# Patient Record
Sex: MALE | Race: WHITE | NOT HISPANIC OR LATINO
[De-identification: names, ages, dates, MRNs, and addresses within clinical notes are randomized per-mention and may not be internally consistent; named-entity substitution may affect disease eponyms.]

---

## 2022-04-21 PROBLEM — Z00.00 ENCOUNTER FOR PREVENTIVE HEALTH EXAMINATION: Status: ACTIVE | Noted: 2022-04-21

## 2022-04-28 PROBLEM — Z82.0 FAMILY HISTORY OF MIGRAINE HEADACHES: Status: ACTIVE | Noted: 2022-04-28

## 2022-04-28 PROBLEM — Z87.891 FORMER LIGHT TOBACCO SMOKER: Status: ACTIVE | Noted: 2022-04-28

## 2022-04-28 PROBLEM — M43.02 CERVICAL SPONDYLOLYSIS: Status: ACTIVE | Noted: 2022-04-28

## 2022-04-28 PROBLEM — G43.909 MIGRAINE: Status: ACTIVE | Noted: 2022-04-28

## 2022-04-28 PROBLEM — Z72.89 ALCOHOL USE: Status: ACTIVE | Noted: 2022-04-28

## 2022-04-28 RX ORDER — ONABOTULINUMTOXINA 200 [USP'U]/1
200 INJECTION, POWDER, LYOPHILIZED, FOR SOLUTION INTRADERMAL; INTRAMUSCULAR
Qty: 1 | Refills: 0 | Status: ACTIVE | COMMUNITY

## 2022-05-02 ENCOUNTER — APPOINTMENT (OUTPATIENT)
Dept: HEART AND VASCULAR | Facility: CLINIC | Age: 52
End: 2022-05-02
Payer: COMMERCIAL

## 2022-05-02 ENCOUNTER — NON-APPOINTMENT (OUTPATIENT)
Age: 52
End: 2022-05-02

## 2022-05-02 VITALS
TEMPERATURE: 97.3 F | WEIGHT: 207 LBS | BODY MASS INDEX: 24.44 KG/M2 | HEART RATE: 85 BPM | OXYGEN SATURATION: 97 % | DIASTOLIC BLOOD PRESSURE: 75 MMHG | HEIGHT: 77 IN | SYSTOLIC BLOOD PRESSURE: 120 MMHG

## 2022-05-02 DIAGNOSIS — Z82.0 FAMILY HISTORY OF EPILEPSY AND OTHER DISEASES OF THE NERVOUS SYSTEM: ICD-10-CM

## 2022-05-02 DIAGNOSIS — Z82.49 FAMILY HISTORY OF ISCHEMIC HEART DISEASE AND OTHER DISEASES OF THE CIRCULATORY SYSTEM: ICD-10-CM

## 2022-05-02 DIAGNOSIS — M43.02 SPONDYLOLYSIS, CERVICAL REGION: ICD-10-CM

## 2022-05-02 DIAGNOSIS — Z72.89 OTHER PROBLEMS RELATED TO LIFESTYLE: ICD-10-CM

## 2022-05-02 DIAGNOSIS — Z84.1 FAMILY HISTORY OF DISORDERS OF KIDNEY AND URETER: ICD-10-CM

## 2022-05-02 DIAGNOSIS — G43.909 MIGRAINE, UNSPECIFIED, NOT INTRACTABLE, W/OUT STATUS MIGRAINOSUS: ICD-10-CM

## 2022-05-02 DIAGNOSIS — Z87.891 PERSONAL HISTORY OF NICOTINE DEPENDENCE: ICD-10-CM

## 2022-05-02 PROCEDURE — 93000 ELECTROCARDIOGRAM COMPLETE: CPT

## 2022-05-02 PROCEDURE — 99204 OFFICE O/P NEW MOD 45 MIN: CPT | Mod: 25

## 2022-05-02 RX ORDER — NAPROXEN 500 MG/1
500 TABLET ORAL
Qty: 30 | Refills: 0 | Status: ACTIVE | COMMUNITY
Start: 2022-04-07

## 2022-05-02 NOTE — REVIEW OF SYSTEMS
[Negative] : Heme/Lymph [Dyspnea on exertion] : dyspnea during exertion [Chest Discomfort] : chest discomfort [de-identified] : + headache

## 2022-05-02 NOTE — HISTORY OF PRESENT ILLNESS
[FreeTextEntry1] : Mr. Hsu presents for initial evaluation and management of HTN, cervical spondylosis, migraine headache, dyslipidemia, and nephrolithiasis. On 04/13/22, he went for a run and had complaints of light headedness, SALMON, and chest pain.  The symptoms lasted for 1-2 hours.  He describes the chest pain as tightness, nonradiating, exertional, 6/10 in intensity, and resolving spontaneously.  The following day, 04/14/22, he saw his PMD, Phoebe Velasquez MD, who sent him to the [facility] ED where he ruled out for an ACS and was sent home.  Since that time he has ongoing SALMON and light headedness.  Of note, his symptoms correlate with the initiation of chlorthalidone and he has been off of it since.

## 2022-05-02 NOTE — ASSESSMENT
[FreeTextEntry1] : 1. HTN: BP at ACC/AHA 2017 guideline target \par      - continue candesartan 32mg po qd\par      - continue off chlorthalidone 25mg po qd secondary to orthostasis\par \par 2. Dyslipidemia:  (04/07/22) \par      - continue atorvastatin 20mg po qd \par      - discussed therapeutic lifestyle changes to promote improved lipid metabolism \par \par 3. Migraine headache, chronic severe: \par      - follow up with neurologist at [facility], Wendy Jenkins MD  \par      - continue candesartan 8mg 2 tabs po qd\par      - continue eletriptan 40mg po prn\par      - continue onabotulinumtoxin A (botox) 200 Units into the muscle area as directed every 3 months\par \par 4. Chest pain and SALMON:\par      - will send for an exercise stress echocardiogram to rule out inducible ischemia \par    \par \par \par An ECG was obtained to evaluate heart rhythm and screen for any underlying cardiac abnormalities. \par \par \par

## 2022-05-02 NOTE — REASON FOR VISIT
[Other: ____] : [unfilled] [FreeTextEntry1] : Diagnostic Tests:\par ------------------------------------------\par ECG:\par 05/02/22: sinus rhythm, normal ECG.

## 2022-05-03 ENCOUNTER — APPOINTMENT (OUTPATIENT)
Dept: HEART AND VASCULAR | Facility: CLINIC | Age: 52
End: 2022-05-03

## 2022-05-17 ENCOUNTER — APPOINTMENT (OUTPATIENT)
Dept: HEART AND VASCULAR | Facility: CLINIC | Age: 52
End: 2022-05-17

## 2022-05-19 ENCOUNTER — NON-APPOINTMENT (OUTPATIENT)
Age: 52
End: 2022-05-19

## 2022-05-19 ENCOUNTER — APPOINTMENT (OUTPATIENT)
Dept: HEART AND VASCULAR | Facility: CLINIC | Age: 52
End: 2022-05-19
Payer: COMMERCIAL

## 2022-05-19 PROCEDURE — 93351 STRESS TTE COMPLETE: CPT

## 2022-09-12 ENCOUNTER — APPOINTMENT (OUTPATIENT)
Dept: HEART AND VASCULAR | Facility: CLINIC | Age: 52
End: 2022-09-12

## 2022-09-12 VITALS
OXYGEN SATURATION: 98 % | SYSTOLIC BLOOD PRESSURE: 131 MMHG | HEIGHT: 77 IN | WEIGHT: 208 LBS | BODY MASS INDEX: 24.56 KG/M2 | DIASTOLIC BLOOD PRESSURE: 84 MMHG | HEART RATE: 77 BPM | TEMPERATURE: 97.1 F

## 2022-09-12 VITALS — SYSTOLIC BLOOD PRESSURE: 147 MMHG | DIASTOLIC BLOOD PRESSURE: 105 MMHG | HEART RATE: 77 BPM

## 2022-09-12 PROCEDURE — 99214 OFFICE O/P EST MOD 30 MIN: CPT

## 2022-09-12 RX ORDER — CHLORTHALIDONE 25 MG/1
25 TABLET ORAL
Qty: 30 | Refills: 0 | Status: DISCONTINUED | COMMUNITY
Start: 2022-04-07 | End: 2022-09-12

## 2022-09-12 RX ORDER — GABAPENTIN 100 MG
100 TABLET ORAL 3 TIMES DAILY
Refills: 0 | Status: DISCONTINUED | COMMUNITY
End: 2022-09-12

## 2022-09-12 RX ORDER — ELETRIPTAN HYDROBROMIDE 40 MG/1
40 TABLET, FILM COATED ORAL
Qty: 9 | Refills: 0 | Status: DISCONTINUED | COMMUNITY
Start: 2022-04-07 | End: 2022-09-12

## 2022-09-12 RX ORDER — RIMEGEPANT SULFATE 75 MG/75MG
75 TABLET, ORALLY DISINTEGRATING ORAL
Qty: 8 | Refills: 0 | Status: ACTIVE | COMMUNITY
Start: 2022-06-30

## 2022-09-12 NOTE — ASSESSMENT
[FreeTextEntry1] : 1. HTN: BP near ACC/AHA 2017 guideline target \par      - continue candesartan 32mg po qd\par      - continue off chlorthalidone 25mg po qd secondary to orthostasis\par      - if BP remains above goal next visit will encourage decreased use of NSAIDS\par \par 2. Dyslipidemia:  (04/07/22) \par      - continue atorvastatin 20mg po qd \par      - discussed therapeutic lifestyle changes to promote improved lipid metabolism \par      - patient will provide copy of recent lab work from PMD's office for my review \par \par 3. Migraine headache, chronic severe: \par      - follow up with neurologist at [facility], Wendy Jenkins MD  \par      - continue Nurtec\par      - continue onabotulinumtoxin A (botox) 200 Units into the muscle area as directed every 3 months\par \par    \par The patient was seen and examined with a cardiology fellow as part of their longitudinal ambulatory cardiology training experience.  Permission was obtained at the time of the visit from the patient for the fellow's participation. \par \par \par

## 2022-09-12 NOTE — REASON FOR VISIT
[FreeTextEntry1] : Diagnostic Tests:\par ------------------------------------------\par ECG:\par 05/02/22: sinus rhythm, normal ECG. \par ------------------------------------------\par Stress tests:\par 05/19/22: exercise stress echo: EF 65%, borderline dilated aortic root (4.2 cm, 1.9 cm/m2), normal wall motion, PA pressures, and ECG post 14.8 METs of exercise.

## 2022-09-12 NOTE — HISTORY OF PRESENT ILLNESS
[FreeTextEntry1] : Mr. Hsu presents for follow up and management of HTN, cervical spondylosis, migraine headache, dyslipidemia, and nephrolithiasis. On 04/13/22, he went for a run and had complaints of light headedness, SALMON, and chest pain.  The symptoms lasted for 1-2 hours.  He describes the chest pain as tightness, nonradiating, exertional, 6/10 in intensity, and resolving spontaneously.  The following day, 04/14/22, he saw his PMD, Phoebe Velasquez MD, who sent him to the [facility] ED where he ruled out for an ACS and was sent home.  Since that time he has ongoing SALMON and light headedness.  Of note, his symptoms correlate with the initiation of chlorthalidone and he has been off of it since. On 05/19/22 he had an exercise stress echocardiogram which revealed an EF of 65%, borderline dilated aortic root (4.2 cm, 1.9 cm/m2), and normal wall motion, PA pressures, and ECG post 14.8 METs of exercise.   At present, he feels well.  His ambulatory BP log reveals an average SBP in the 130s to low 140s.

## 2023-01-12 ENCOUNTER — APPOINTMENT (OUTPATIENT)
Dept: HEART AND VASCULAR | Facility: CLINIC | Age: 53
End: 2023-01-12
Payer: COMMERCIAL

## 2023-01-12 ENCOUNTER — NON-APPOINTMENT (OUTPATIENT)
Age: 53
End: 2023-01-12

## 2023-01-12 VITALS
WEIGHT: 211 LBS | DIASTOLIC BLOOD PRESSURE: 80 MMHG | OXYGEN SATURATION: 98 % | BODY MASS INDEX: 24.91 KG/M2 | HEIGHT: 77 IN | SYSTOLIC BLOOD PRESSURE: 145 MMHG | HEART RATE: 78 BPM

## 2023-01-12 VITALS — DIASTOLIC BLOOD PRESSURE: 73 MMHG | SYSTOLIC BLOOD PRESSURE: 133 MMHG

## 2023-01-12 PROCEDURE — 99214 OFFICE O/P EST MOD 30 MIN: CPT | Mod: 25

## 2023-01-12 PROCEDURE — 93000 ELECTROCARDIOGRAM COMPLETE: CPT

## 2023-01-12 NOTE — HISTORY OF PRESENT ILLNESS
[FreeTextEntry1] : Mr. Hsu presents for follow up and management of HTN, cervical spondylosis, migraine headache, dyslipidemia, and nephrolithiasis. On 04/13/22, he went for a run and had complaints of light headedness, SALMON, and chest pain.  The symptoms lasted for 1-2 hours.  He describes the chest pain as tightness, nonradiating, exertional, 6/10 in intensity, and resolving spontaneously.  The following day, 04/14/22, he saw his PMD, Phoebe Velasquez MD, who sent him to the [facility] ED where he ruled out for an ACS and was sent home.  Since that time he has ongoing SALMON and light headedness.  Of note, his symptoms correlate with the initiation of chlorthalidone and he has been off of it since. On 05/19/22 he had an exercise stress echocardiogram which revealed an EF of 65%, borderline dilated aortic root (4.2 cm, 1.9 cm/m2), and normal wall motion, PA pressures, and ECG post 14.8 METs of exercise.   At present, he feels well.

## 2023-01-12 NOTE — ASSESSMENT
[FreeTextEntry1] : 1. HTN: BP near ACC/AHA 2017 guideline target:  \par      - change candesartan 32mg po qd to valsartan 320mg po qd (secondary to formulary coverage cost)\par      - continue off chlorthalidone 25mg po qd secondary to orthostasis\par \par 2. Dyslipidemia: LDL 95 (09/21/22): \par      - continue atorvastatin 20mg po qd \par      - discussed therapeutic lifestyle changes to promote improved lipid metabolism \par      - patient will provide copy of recent lab work from PMD's office for my review \par \par 3. Migraine headache, chronic severe: \par      - follow up with neurologist at [facility], Wendy Jenkins MD  \par      - continue Nurtec\par      - continue onabotulinumtoxin A (botox) 200 Units into the muscle area as directed every 3 months\par \par    \par \par \par \par

## 2023-01-12 NOTE — REVIEW OF SYSTEMS
[Dyspnea on exertion] : dyspnea during exertion [Chest Discomfort] : chest discomfort [Negative] : Heme/Lymph [de-identified] : + headache

## 2023-01-12 NOTE — REASON FOR VISIT
[Other: ____] : [unfilled] [FreeTextEntry1] : Diagnostic Tests:\par ------------------------------------------\par ECG:\par 01/12/23: sinus rhythm, RSR/ V1. \par 05/02/22: sinus rhythm, normal ECG. \par ------------------------------------------\par Stress tests:\par 05/19/22: exercise stress echo: EF 65%, borderline dilated aortic root (4.2 cm, 1.9 cm/m2), normal wall motion, PA pressures, and ECG post 14.8 METs of exercise.

## 2023-05-10 ENCOUNTER — NON-APPOINTMENT (OUTPATIENT)
Age: 53
End: 2023-05-10

## 2023-05-11 ENCOUNTER — APPOINTMENT (OUTPATIENT)
Dept: HEART AND VASCULAR | Facility: CLINIC | Age: 53
End: 2023-05-11
Payer: COMMERCIAL

## 2023-05-11 VITALS
BODY MASS INDEX: 24.21 KG/M2 | OXYGEN SATURATION: 98 % | HEIGHT: 77 IN | HEART RATE: 79 BPM | DIASTOLIC BLOOD PRESSURE: 80 MMHG | WEIGHT: 205 LBS | TEMPERATURE: 96.8 F | SYSTOLIC BLOOD PRESSURE: 133 MMHG

## 2023-05-11 VITALS — SYSTOLIC BLOOD PRESSURE: 111 MMHG | DIASTOLIC BLOOD PRESSURE: 73 MMHG

## 2023-05-11 PROCEDURE — 99214 OFFICE O/P EST MOD 30 MIN: CPT

## 2023-05-11 NOTE — REVIEW OF SYSTEMS
[Chest Discomfort] : chest discomfort [Dyspnea on exertion] : dyspnea during exertion [Negative] : Heme/Lymph [de-identified] : + headache

## 2023-05-11 NOTE — ASSESSMENT
[FreeTextEntry1] : 1. HTN: BP not ACC/AHA 2017 guideline target:  \par      - continue lisinopril 20mg po qd \par      - will observe for ACE-I induced nonproductive cough \par      - will send for an echocardiogram to rule out hypertensive heart disease \par \par 2. Dyslipidemia: LDL 95 (09/21/22): \par      - continue atorvastatin 20mg po qd \par      - discussed therapeutic lifestyle changes to promote improved lipid metabolism \par      - patient will provide copy of recent lab work from PMD's office for my review \par \par 3. Migraine headache, chronic severe: \par      - follow up with neurologist at [facility], Wendy Jenkins MD  \par      - continue Nurtec\par      - continue onabotulinumtoxin A (botox) 200 Units into the muscle area as directed every 3 months\par \par 4. Dilated aortic root: \par      - will follow with serial imaging studies (echocardiogram ordered today) \par \par    \par \par \par \par

## 2023-05-11 NOTE — HISTORY OF PRESENT ILLNESS
[FreeTextEntry1] : Mr. Hsu presents for follow up and management of HTN, cervical spondylosis, migraine headache, dyslipidemia, and nephrolithiasis. On 04/13/22, he went for a run and had complaints of light headedness, SALMON, and chest pain.  The symptoms lasted for 1-2 hours.  He describes the chest pain as tightness, nonradiating, exertional, 6/10 in intensity, and resolving spontaneously.  The following day, 04/14/22, he saw his PMD, Phoebe Velasquez MD, who sent him to the [facility] ED where he ruled out for an ACS and was sent home.  Of note, his symptoms correlate with the initiation of chlorthalidone and he has been off of it since. On 05/19/22 he had an exercise stress echocardiogram which revealed an EF of 65%, borderline dilated aortic root (4.2 cm, 1.9 cm/m2), and normal wall motion, PA pressures, and ECG post 14.8 METs of exercise.  Since last visit, his PMD discontinued valsartan (secondary to low efficacy) and initiated (and titrated) lisinopril.  At present, he does note a nonproductive cough associated with some sinus congestion and we discussed the possible adverse effects of lisinopril.

## 2023-06-20 ENCOUNTER — APPOINTMENT (OUTPATIENT)
Dept: HEART AND VASCULAR | Facility: CLINIC | Age: 53
End: 2023-06-20
Payer: COMMERCIAL

## 2023-06-20 PROCEDURE — 93306 TTE W/DOPPLER COMPLETE: CPT

## 2023-06-21 ENCOUNTER — NON-APPOINTMENT (OUTPATIENT)
Age: 53
End: 2023-06-21

## 2023-11-07 ENCOUNTER — APPOINTMENT (OUTPATIENT)
Dept: HEART AND VASCULAR | Facility: CLINIC | Age: 53
End: 2023-11-07
Payer: COMMERCIAL

## 2023-11-07 ENCOUNTER — NON-APPOINTMENT (OUTPATIENT)
Age: 53
End: 2023-11-07

## 2023-11-07 VITALS
WEIGHT: 201 LBS | DIASTOLIC BLOOD PRESSURE: 80 MMHG | BODY MASS INDEX: 23.73 KG/M2 | SYSTOLIC BLOOD PRESSURE: 113 MMHG | OXYGEN SATURATION: 97 % | HEIGHT: 77 IN | HEART RATE: 77 BPM | TEMPERATURE: 97.1 F

## 2023-11-07 PROCEDURE — 99214 OFFICE O/P EST MOD 30 MIN: CPT | Mod: 25

## 2023-11-07 PROCEDURE — 93000 ELECTROCARDIOGRAM COMPLETE: CPT

## 2023-11-07 RX ORDER — NIFEDIPINE 60 MG/1
60 TABLET, EXTENDED RELEASE ORAL DAILY
Qty: 90 | Refills: 3 | Status: ACTIVE | COMMUNITY

## 2024-05-04 PROBLEM — E78.5 DYSLIPIDEMIA: Status: ACTIVE | Noted: 2022-04-28

## 2024-05-04 PROBLEM — I10 HTN (HYPERTENSION): Status: ACTIVE | Noted: 2022-04-28

## 2024-05-07 ENCOUNTER — APPOINTMENT (OUTPATIENT)
Dept: HEART AND VASCULAR | Facility: CLINIC | Age: 54
End: 2024-05-07
Payer: COMMERCIAL

## 2024-05-07 VITALS
TEMPERATURE: 97.7 F | HEART RATE: 83 BPM | SYSTOLIC BLOOD PRESSURE: 99 MMHG | OXYGEN SATURATION: 98 % | WEIGHT: 207 LBS | HEIGHT: 77 IN | DIASTOLIC BLOOD PRESSURE: 60 MMHG | BODY MASS INDEX: 24.44 KG/M2

## 2024-05-07 DIAGNOSIS — R06.09 OTHER FORMS OF DYSPNEA: ICD-10-CM

## 2024-05-07 DIAGNOSIS — Z87.898 PERSONAL HISTORY OF OTHER SPECIFIED CONDITIONS: ICD-10-CM

## 2024-05-07 DIAGNOSIS — I10 ESSENTIAL (PRIMARY) HYPERTENSION: ICD-10-CM

## 2024-05-07 DIAGNOSIS — E78.5 HYPERLIPIDEMIA, UNSPECIFIED: ICD-10-CM

## 2024-05-07 PROCEDURE — 93000 ELECTROCARDIOGRAM COMPLETE: CPT

## 2024-05-07 PROCEDURE — 99214 OFFICE O/P EST MOD 30 MIN: CPT

## 2024-05-07 PROCEDURE — G2211 COMPLEX E/M VISIT ADD ON: CPT

## 2024-05-07 RX ORDER — ATORVASTATIN CALCIUM 40 MG/1
40 TABLET, FILM COATED ORAL DAILY
Qty: 90 | Refills: 3 | Status: ACTIVE | COMMUNITY
Start: 2022-02-18 | End: 1900-01-01

## 2024-05-07 NOTE — ASSESSMENT
[FreeTextEntry1] : ======================================================================================= 1. HTN: BP at ACC/AHA 2017 guideline target:  + ACEI-I cough, ? valsartan ineffective:       - continue nifedipine 60mg po qd   2. Dyslipidemia:  (02/28/24):       - increase atorvastatin from 20mg po qd to 40mg po qd to optimize CV risk reduction       - discussed therapeutic lifestyle changes to promote improved lipid metabolism       - patient will provide copy of recent lab work from PMD's office for my review   3. Migraine headache, chronic severe:       - follow up with neurologist at [facility], Wendy Jenkins MD        - continue Nurtec prn      - continue onabotulinumtoxin A (botox) 200 Units into the muscle area as directed every 3 months  4. Dilated aortic root: 3.8 cm (1.7 cm/m2):       - will follow with serial imaging studies (echocardiogram ordered today)

## 2024-05-07 NOTE — REASON FOR VISIT
[Other: ____] : [unfilled] [FreeTextEntry1] : ======================================================================================= Diagnostic Tests: -------------------------------------------------------------- EC24: sinus rhythm, normal ECG.  23: sinus rhythm, normal ECG.  23: sinus rhythm, RSR/ V1.  22: sinus rhythm, normal ECG.  -------------------------------------------------------------- Stress tests: 22: exercise stress echo: EF 65%, borderline dilated aortic root (4.2 cm, 1.9 cm/m2), normal wall motion, PA pressures, and ECG post 14.8 METs of exercise.  -------------------------------------------------------------- Echo: 23: EF 59%, aortic root 3.8 cm (1.7 cm/m2).

## 2024-05-07 NOTE — HISTORY OF PRESENT ILLNESS
[FreeTextEntry1] : Mr. Hsu presents for follow up and management of HTN, cervical spondylosis, migraine headache, dyslipidemia, and nephrolithiasis. On 04/13/22, he went for a run and had complaints of light headedness, SALMON, and chest pain.  The symptoms lasted for 1-2 hours.  He described the chest pain as tightness, nonradiating, exertional, 6/10 in intensity, and resolving spontaneously.  The following day, 04/14/22, he saw his PMD, Phoebe Velasquez MD, who sent him to the [facility] ED where he ruled out for an ACS and was sent home.  Of note, his symptoms correlate with the initiation of chlorthalidone, and he has been off of it since. On 05/19/22, he had an exercise stress echocardiogram which revealed an EF of 65%, borderline dilated aortic root (4.2 cm, 1.9 cm/m2), and normal wall motion, PA pressures, and ECG post 14.8 METs of exercise.  His PMD discontinued valsartan (secondary to low efficacy) and initiated (and titrated) lisinopril.  At present, he does note a nonproductive cough associated with some sinus congestion and we discussed the possible adverse effects of lisinopril.   On 06/20/23, he had an echocardiogram which revealed an EF of 59% and was a normal study. His ACE-I induced cough persisted and he was switched to nifedipine 60mg po qd .  He has been well since last visit.

## 2024-05-07 NOTE — REVIEW OF SYSTEMS
[Dyspnea on exertion] : dyspnea during exertion [Chest Discomfort] : chest discomfort [Negative] : Heme/Lymph [de-identified] : + headache

## 2024-12-12 ENCOUNTER — NON-APPOINTMENT (OUTPATIENT)
Age: 54
End: 2024-12-12

## 2024-12-12 ENCOUNTER — APPOINTMENT (OUTPATIENT)
Dept: HEART AND VASCULAR | Facility: CLINIC | Age: 54
End: 2024-12-12
Payer: COMMERCIAL

## 2024-12-12 VITALS
OXYGEN SATURATION: 99 % | HEIGHT: 77 IN | WEIGHT: 212.56 LBS | DIASTOLIC BLOOD PRESSURE: 88 MMHG | BODY MASS INDEX: 25.1 KG/M2 | HEART RATE: 75 BPM | SYSTOLIC BLOOD PRESSURE: 130 MMHG | TEMPERATURE: 98.2 F

## 2024-12-12 VITALS — DIASTOLIC BLOOD PRESSURE: 81 MMHG | SYSTOLIC BLOOD PRESSURE: 119 MMHG

## 2024-12-12 DIAGNOSIS — G47.33 OBSTRUCTIVE SLEEP APNEA (ADULT) (PEDIATRIC): ICD-10-CM

## 2024-12-12 DIAGNOSIS — E78.5 HYPERLIPIDEMIA, UNSPECIFIED: ICD-10-CM

## 2024-12-12 DIAGNOSIS — I10 ESSENTIAL (PRIMARY) HYPERTENSION: ICD-10-CM

## 2024-12-12 PROCEDURE — 93000 ELECTROCARDIOGRAM COMPLETE: CPT

## 2024-12-12 PROCEDURE — 99214 OFFICE O/P EST MOD 30 MIN: CPT | Mod: 25

## 2024-12-12 PROCEDURE — 93306 TTE W/DOPPLER COMPLETE: CPT | Mod: 59

## 2024-12-25 PROBLEM — F10.90 ALCOHOL USE: Status: ACTIVE | Noted: 2022-04-28

## 2025-06-09 ENCOUNTER — APPOINTMENT (OUTPATIENT)
Dept: HEART AND VASCULAR | Facility: CLINIC | Age: 55
End: 2025-06-09

## 2025-06-10 ENCOUNTER — APPOINTMENT (OUTPATIENT)
Dept: HEART AND VASCULAR | Facility: CLINIC | Age: 55
End: 2025-06-10
Payer: COMMERCIAL

## 2025-06-10 VITALS
HEART RATE: 74 BPM | SYSTOLIC BLOOD PRESSURE: 102 MMHG | BODY MASS INDEX: 24.91 KG/M2 | TEMPERATURE: 98.4 F | WEIGHT: 211 LBS | OXYGEN SATURATION: 98 % | HEIGHT: 77 IN | DIASTOLIC BLOOD PRESSURE: 66 MMHG

## 2025-06-10 PROCEDURE — 93000 ELECTROCARDIOGRAM COMPLETE: CPT

## 2025-06-10 PROCEDURE — G2211 COMPLEX E/M VISIT ADD ON: CPT | Mod: NC

## 2025-06-10 PROCEDURE — 99214 OFFICE O/P EST MOD 30 MIN: CPT

## 2025-06-10 RX ORDER — TADALAFIL 10 MG/1
10 TABLET, FILM COATED ORAL
Qty: 10 | Refills: 0 | Status: ACTIVE | COMMUNITY
Start: 2025-05-29